# Patient Record
Sex: MALE | Race: WHITE | Employment: FULL TIME | URBAN - METROPOLITAN AREA
[De-identification: names, ages, dates, MRNs, and addresses within clinical notes are randomized per-mention and may not be internally consistent; named-entity substitution may affect disease eponyms.]

---

## 2018-04-20 ENCOUNTER — HOSPITAL ENCOUNTER (OUTPATIENT)
Dept: PHYSICAL THERAPY | Age: 28
Discharge: HOME | End: 2018-04-20
Payer: COMMERCIAL

## 2018-04-20 PROCEDURE — 97140 MANUAL THERAPY 1/> REGIONS: CPT | Performed by: PHYSICAL THERAPIST

## 2018-04-20 PROCEDURE — 97161 PT EVAL LOW COMPLEX 20 MIN: CPT | Performed by: PHYSICAL THERAPIST

## 2018-04-20 PROCEDURE — 97112 NEUROMUSCULAR REEDUCATION: CPT | Performed by: PHYSICAL THERAPIST

## 2018-04-20 NOTE — PROGRESS NOTES
PT INITIAL EVALUATION NOTE 2-15    Patient Name: Tanesha Precise  Date:2018  : 1990  [x]  Patient  Verified  Payor: Abdullahi Alexis / Plan: Stevan Bush / Product Type: HMO /    In time:11:30 AM  Out time:12:30 PM  Total Treatment Time (min): 60  Visit #: 1     Treatment Area: Low back pain [M54.5]    SUBJECTIVE  Pain Level (0-10 scale): 1/10  At worst 4/10  At best 0/10  Any medication changes, allergies to medications, adverse drug reactions, diagnosis change, or new procedure performed?: [] No    [x] Yes (see summary sheet for update)  Subjective:     Pt c/o upper back pain for several years. Pain has not changed since onset. Pt denies radiating pain/ tingling. Some numbness in the center of his back. Pt denies HA pain. Pt has been managed with chiropractic care. No imaging. Pain is increased by standing for long periods of time. Pain is decreased by laying flat a  Pain is sometimes present at night, on his L side. Pt has been wearing insoles since he was in 9th grade  Pt reports she is trying to get into the [employer]    PLOF: Pt works full time in a sedentary job and enjoys running, weight lifting and [employer]  Mechanism of Injury: Insidious onset  Previous Treatment/Compliance: Chiropractic care  PMHx/Surgical Hx: -  Work Hx: Pt works for [employer] and he sometimes has pain sitting at work  Living Situation: n/a  Pt Goals: Stretches/ routines to improve posture  Barriers: Chronic nature of condition  Motivation: Good  Substance use: None   FABQ Score: see FOTO  Cognition: A & O x 3        OBJECTIVE/EXAMINATION   Postural Restoration [place] (KORTNEY) Evaluation     Increased thoracic kyphosis  Increased lumbar lordosis  Rounded shoulders  Significantly over used lumbar paraspinals             Left   Right  Standing  Standing Reach Test (M)    To floor     Functional Squat Test  (P)    L4 Cheating?     Sitting  FA IR R.O.M. (M)     30 degrees  30 degrees  FA ER R.O.M.  (M)     20 degrees  20 degrees    Sidelying  Adduction Drop Test (M)    +   -  Hruska Adduction Lift Test (M)   L1   L1  Pelvic [place] Drop Test (PADT) (P)  +   -  Passive Abduction Raise Test (PART) (P)  NT   NT  Passive IP ER Expansion Test (P)   +   +    POSTURAL RESPIRATION EXAMINATION Left   Right  Apical Expansion (R)     +   +  Horizontal Abduction (R)    45 degrees  45 degrees  Shoulder Flexion (R)     180 degrees  180 degrees  HG IR (R)      90 degrees  90 degrees  Subclavius Flexibility (R)    Limited   Limited  Elevated and Externally Rotated Ant. Ribs (R) +   +       25 min Neuromuscular Re-education:  [x]  See flow sheet :   Rationale: improve coordination, improve balance and increase proprioception  to improve the patients ability to sit, stand, transfer, ambulate, lift, carry, reach, complete ADLs    10 min Manual Therapy:  Infraclavicular pumping, L pec release   Rationale: decrease pain, increase ROM, increase tissue extensibility and decrease trigger points  to improve the patients ability to sit, stand, transfer, ambulate, lift, carry, reach, complete ADLs         With   [] TE   [] TA   [] neuro   [] other: Patient Education: [x] Review HEP    [] Progressed/Changed HEP based on:   [] positioning   [] body mechanics   [] transfers   [] heat/ice application    [] other:        Other Objective/Functional Measures:   Following intervention  Improved R apical expansion, L pec flexibility, HAdLT L L3 R L2  HAdDT - B    Pain Level (0-10 scale) post treatment: 0      ASSESSMENT:      [x]  See Plan of [facility], PT 4/20/2018  11:27 AM

## 2018-04-20 NOTE — PROGRESS NOTES
[address]  Phone: [phone]  Fax: [phone]    Plan of Care/ Statement of Necessity for Physical Therapy Services 2-15    Patient name: Sheri Mckeon  : 1990  Provider#: [id number]  Referral source: Pat Clemons MD      Medical/Treatment Diagnosis: Low back pain [M54.5]     Prior Hospitalization: see medical history     Comorbidities: None  Prior Level of Function: Pt enjoys lifting, swimming and weight lifting. Pt works full time in a sedentary role. Medications: Verified on Patient Summary List    Start of Care: 17      Onset Date: \"several years ago\"       The Plan of Care and following information is based on the information from the initial evaluation. Assessment/ key information: The patient presents with patho PEC patterning per [facility] treatment approach, contributing to upper back pain and tightness, affecting his ability to run, stand and sit.     Evaluation Complexity History LOW Complexity : Zero comorbidities / personal factors that will impact the outcome / POC; Examination HIGH Complexity : 4+ Standardized tests and measures addressing body structure, function, activity limitation and / or participation in recreation  ;Presentation LOW Complexity : Stable, uncomplicated  ;Clinical Decision Making MEDIUM Complexity : FOTO score of 26-74  Overall Complexity Rating: LOW     Problem List: pain affecting function, decrease ROM, decrease strength, impaired gait/ balance, decrease ADL/ functional abilitiies, decrease activity tolerance, decrease flexibility/ joint mobility and decrease transfer abilities   Treatment Plan may include any combination of the following: Neuromuscular re-education, Manual therapy and Patient education  Patient / Family readiness to learn indicated by: asking questions, trying to perform skills and interest  Persons(s) to be included in education: patient (P)  Barriers to Learning/Limitations: None  Patient Goal (s): learn stretches/ routines to improve posture  Patient Self Reported Health Status: excellent  Rehabilitation Potential: excellent    Short Term Goals: To be accomplished in 4 treatments:  1) Patient will demonstrate Negative Hruska Adduction Drop Test   2) Patient will demonstrate independence with HEP  3) Patient will demonstrate greater than Grade II on Hruska Adduction Lift Test  Long Term Goals: To be accomplished in 8 treatments:  1)Patient will demonstrate Grade IV or Grade V Hruska Adduction Lift Score to allow for functional mobility in home and community settings  2)Pt will demonstrate the ability to ambulate forward and backward achieving bilateral Acetabular Femoral Internal Rotation for pain free mobility  3)Pt will demonstrate the ability to run without subjective complaints or gait deviation  4)Pt will demonstrate independence with discharge HEP to allow for ambulation, sitting and standing without objective dysfunction  Frequency / Duration: Patient to be seen 1 times per week for 12 treatments. Patient/ Caregiver education and instruction: self care, activity modification and exercises    [x]  Plan of care has been reviewed with PTA Nicoletta Cushing, PT 4/20/2018 12:41 PM    ________________________________________________________________________    I certify that the above Therapy Services are being furnished while the patient is under my care. I agree with the treatment plan and certify that this therapy is necessary.     [de-identified] Signature:____________________  Date:____________Time: _________

## 2018-04-24 ENCOUNTER — HOSPITAL ENCOUNTER (OUTPATIENT)
Dept: PHYSICAL THERAPY | Age: 28
Discharge: HOME | End: 2018-04-24
Payer: COMMERCIAL

## 2018-04-24 PROCEDURE — 97112 NEUROMUSCULAR REEDUCATION: CPT | Performed by: PHYSICAL MEDICINE & REHABILITATION

## 2018-04-24 NOTE — PROGRESS NOTES
PT DAILY TREATMENT NOTE - [facility] 2-15    Patient Name: Joselyn Renee  Date:2018  : 1990  [x]  Patient  Verified  Payor: Rahul Amezquita / Plan: Wendy Veloz / Product Type: HMO /    In time:315 pm  Out time:400 pm  Total Treatment Time (min): 45  Total Timed Codes (min): 45  1:1 Treatment Time ( only): -   Visit #: 2     Treatment Area: Low back pain [M54.5]    SUBJECTIVE  Pain Level (0-10 scale): 0/10  Any medication changes, allergies to medications, adverse drug reactions, diagnosis change, or new procedure performed?: [x] No    [] Yes (see summary sheet for update)  Subjective functional status/changes:   [] No changes reported  Pt reported that he has been feeling pretty good overall with no pain over the past 3 days. OBJECTIVE    45 min Neuromuscular Re-education:  [x]  See flow sheet :   Rationale: increase ROM, increase strength, improve coordination, improve balance and increase proprioception  to improve the patients ability to ADLs, standing and working tolerance       With   [x] TE   [] TA   [] neuro   [] other: Patient Education: [x] Review HEP    [] Progressed/Changed HEP based on:   [] positioning   [] body mechanics   [] transfers   [] heat/ice application    [] other:      Other Objective/Functional Measures:   HAdDT: - B  BC: - B  HAdLT: 1/5      Pain Level (0-10 scale) post treatment: 0/10    ASSESSMENT/Changes in Function:     Patient will continue to benefit from skilled PT services to modify and progress therapeutic interventions, address functional mobility deficits, address ROM deficits, address strength deficits, analyze and address soft tissue restrictions, analyze and cue movement patterns, analyze and modify body mechanics/ergonomics and assess and modify postural abnormalities to attain remaining goals.      []  See Plan of Care  []  See progress note/recertification  []  See Discharge Summary         Progress towards goals / Updated goals:  Patient is progressing very well with KORTNEY and should continue to do well with improving sitting tolerance at work.     PLAN  [x]  Upgrade activities as tolerated     [x]  Continue plan of care  [x]  Update interventions per flow sheet       []  Discharge due to:_  []  Other:_      Magdalene Cooks, PTA, CPT 4/24/2018  4:56 PM

## 2018-05-03 ENCOUNTER — HOSPITAL ENCOUNTER (OUTPATIENT)
Dept: PHYSICAL THERAPY | Age: 28
Discharge: HOME | End: 2018-05-03
Payer: COMMERCIAL

## 2018-05-03 PROCEDURE — 97112 NEUROMUSCULAR REEDUCATION: CPT | Performed by: PHYSICAL MEDICINE & REHABILITATION

## 2018-05-03 NOTE — PROGRESS NOTES
PT DAILY TREATMENT NOTE - [facility] 2-15    Patient Name: Figueroa Clarity  Date:5/3/2018  : 1990  [x]  Patient  Verified  Payor: Courtney Downey / Plan: Shai Veloz / Product Type: HMO /    In time:430 pm  Out time:515 pm  Total Treatment Time (min): 45  Total Timed Codes (min): 45  1:1 Treatment Time ([address] only): -   Visit #: 3     Treatment Area: Low back pain [M54.5]    SUBJECTIVE  Pain Level (0-10 scale): 2/10  Any medication changes, allergies to medications, adverse drug reactions, diagnosis change, or new procedure performed?: [x] No    [] Yes (see summary sheet for update)  Subjective functional status/changes:   [] No changes reported  Pt reported that he went to a camp for [employer] and did very well there but because of the traveling he wasn't able to get to his exercises like he should and had some pain throughout today. OBJECTIVE    45 min Neuromuscular Re-education:  [x]  See flow sheet :   Rationale: increase ROM, increase strength, improve coordination, improve balance and increase proprioception  to improve the patients ability to ADLs, standing and working tolerance       With   [x] TE   [] TA   [] neuro   [] other: Patient Education: [x] Review HEP    [] Progressed/Changed HEP based on:   [] positioning   [] body mechanics   [] transfers   [] heat/ice application    [] other:      Other Objective/Functional Measures:   HAdDT: - B  BC: - B  HAdLT: 3/5      Pain Level (0-10 scale) post treatment: 0/10    ASSESSMENT/Changes in Function:     Patient will continue to benefit from skilled PT services to modify and progress therapeutic interventions, address functional mobility deficits, address ROM deficits, address strength deficits, analyze and address soft tissue restrictions, analyze and cue movement patterns, analyze and modify body mechanics/ergonomics and assess and modify postural abnormalities to attain remaining goals.      []  See Plan of Care  []  See progress note/recertification  [] See Discharge Summary         Progress towards goals / Updated goals:  Patient is progressing very well with KORTNEY and should continue to do well with improving sitting tolerance at work.     PLAN  [x]  Upgrade activities as tolerated     [x]  Continue plan of care  [x]  Update interventions per flow sheet       []  Discharge due to:_  []  Other:_      Wilbur Greenberg PTA, CPT 5/3/2018  4:56 PM

## 2018-05-17 ENCOUNTER — HOSPITAL ENCOUNTER (OUTPATIENT)
Dept: PHYSICAL THERAPY | Age: 28
Discharge: HOME | End: 2018-05-17
Payer: COMMERCIAL

## 2018-05-17 PROCEDURE — 97112 NEUROMUSCULAR REEDUCATION: CPT | Performed by: PHYSICAL MEDICINE & REHABILITATION

## 2018-05-17 NOTE — PROGRESS NOTES
PT DAILY TREATMENT NOTE - [facility] 2-15    Patient Name: Kumar Ribeiro  Date:2018  : 1990  [x]  Patient  Verified  Payor: Diogenes Pruitt / Plan: Joseluis Bravo / Product Type: HMO /    In time:350 pm  Out time:430 pm  Total Treatment Time (min): 40  Total Timed Codes (min): 40  1:1 Treatment Time ( only): -   Visit #: 5     Treatment Area: Low back pain [M54.5]    SUBJECTIVE  Pain Level (0-10 scale): 0/10  Any medication changes, allergies to medications, adverse drug reactions, diagnosis change, or new procedure performed?: [x] No    [] Yes (see summary sheet for update)  Subjective functional status/changes:   [] No changes reported  Pt reported that he continues to feel great and having no issues since last visit. OBJECTIVE    40 min Neuromuscular Re-education:  [x]  See flow sheet :   Rationale: increase ROM, increase strength, improve coordination, improve balance and increase proprioception  to improve the patients ability to ADLs, standing and working tolerance       With   [x] TE   [] TA   [] neuro   [] other: Patient Education: [x] Review HEP    [] Progressed/Changed HEP based on:   [] positioning   [] body mechanics   [] transfers   [] heat/ice application    [] other:      Other Objective/Functional Measures:   HAdDT: - B  BC: - B  HAdLT: 5/5      Pain Level (0-10 scale) post treatment: 0/10    ASSESSMENT/Changes in Function:   []  See Plan of Care  []  See progress note/recertification  [x]  See Discharge Summary         Progress towards goals / Updated goals:  Short Term Goals: To be accomplished in 4 treatments:  1) Patient will demonstrate Negative Hruska Adduction Drop Test  - MET  2) Patient will demonstrate independence with HEP - MET  3) Patient will demonstrate greater than Grade II on Hruska Adduction Lift Test - MET  Long Term Goals:  To be accomplished in 8 treatments:  1)Patient will demonstrate Grade IV or Grade V Hruska Adduction Lift Score to allow for functional mobility in home and community settings - MET  2)Pt will demonstrate the ability to ambulate forward and backward achieving bilateral Acetabular Femoral Internal Rotation for pain free mobility - MET  3)Pt will demonstrate the ability to run without subjective complaints or gait deviation - MET  4)Pt will demonstrate independence with discharge HEP to allow for ambulation, sitting and standing without objective dysfunction - MET    PLAN  []  Upgrade activities as tolerated     []  Continue plan of care  []  Update interventions per flow sheet       [x]  Discharge due to: Goals Met  []  Other:_      Dorina Manuel PTA, CPT 5/17/2018  4:56 PM

## 2018-05-21 NOTE — ANCILLARY DISCHARGE INSTRUCTIONS
[address] [facility] [address]  Phone: [phone]  Fax: [phone]    Discharge Summary  2-15    Patient name: Oneal Corbin  : 1990  Provider#: [id number]  Referral source: Tessie Cardenas MD      Medical/Treatment Diagnosis: Low back pain [M54.5]     Prior Hospitalization: see medical history     Comorbidities: See Plan of Care  Prior Level of Function:See Plan of Care  Medications: Verified on Patient Summary List    Start of Care: 17      Onset Date:\"several years ago\"   Visits from Start of Care: 5     Missed Visits: 0  Reporting Period : 18 to 18      ASSESSMENT/SUMMARY OF CARE:  The patient has completed 5 physical therapy visits and has met all short and long term goals. The patient scored a 99% on the FOTO outcomes survey, a significant improvement from initial evaluation score of 74%. The patient has maximized therapeutic benefit at this time and is ready for discharge to independent HEP.       Short Term Goals: To be accomplished in 4 treatments:  1) Patient will demonstrate Negative Hruska Adduction Drop Test  - MET  2) Patient will demonstrate independence with HEP - MET  3) Patient will demonstrate greater than Grade II on Hruska Adduction Lift Test - MET  Long Term Goals: To be accomplished in 8 treatments:  1)Patient will demonstrate Grade IV or Grade V Hruska Adduction Lift Score to allow for functional mobility in home and community settings - MET  2)Pt will demonstrate the ability to ambulate forward and backward achieving bilateral Acetabular Femoral Internal Rotation for pain free mobility - MET  3)Pt will demonstrate the ability to run without subjective complaints or gait deviation - MET  4)Pt will demonstrate independence with discharge HEP to allow for ambulation, sitting and standing without objective dysfunction - MET    RECOMMENDATIONS:  [x]Discontinue therapy: [x]Patient has reached or is progressing toward set goals      []Patient is non-compliant or has abdicated      []Due to lack of appreciable progress towards set goals      []Other    Devonna Collet, PT 5/21/2018 3:25 PM